# Patient Record
Sex: MALE | Race: WHITE
[De-identification: names, ages, dates, MRNs, and addresses within clinical notes are randomized per-mention and may not be internally consistent; named-entity substitution may affect disease eponyms.]

---

## 2020-04-08 ENCOUNTER — HOSPITAL ENCOUNTER (OUTPATIENT)
Dept: HOSPITAL 56 - MW.ED | Age: 35
LOS: 1 days | Discharge: HOME | End: 2020-04-09
Attending: SURGERY
Payer: COMMERCIAL

## 2020-04-08 DIAGNOSIS — Z79.899: ICD-10-CM

## 2020-04-08 DIAGNOSIS — I10: ICD-10-CM

## 2020-04-08 DIAGNOSIS — K35.80: Primary | ICD-10-CM

## 2020-04-08 LAB
BUN SERPL-MCNC: 20 MG/DL (ref 7–18)
CHLORIDE SERPL-SCNC: 100 MMOL/L (ref 98–107)
CO2 SERPL-SCNC: 24.7 MMOL/L (ref 21–32)
GLUCOSE SERPL-MCNC: 157 MG/DL (ref 74–106)
LIPASE SERPL-CCNC: 73 U/L (ref 73–393)
POTASSIUM SERPL-SCNC: 3.7 MMOL/L (ref 3.5–5.1)
SODIUM SERPL-SCNC: 137 MMOL/L (ref 136–148)

## 2020-04-08 PROCEDURE — C1776 JOINT DEVICE (IMPLANTABLE): HCPCS

## 2020-04-08 RX ADMIN — CEFOXITIN SODIUM SCH MLS/HR: 1 INJECTION, SOLUTION INTRAVENOUS at 20:11

## 2020-04-08 NOTE — PCM.SN
- Free Text/Narrative


Note: 





pt will finished iv mefoxin q6 X 23 hr, then home on pain meds and po abx

## 2020-04-08 NOTE — EDM.PDOC
ED [facility] GENERAL MEDICAL PROBLEM





- General


Chief Complaint: Abdominal Pain


Stated Complaint: ABDOMINAL PAIN


Time Seen by Provider: 04/08/20 12:36





- History of Present Illness


INITIAL COMMENTS - FREE TEXT/NARRATIVE: 





HPI


35-year-old male with no known pertinent past medical history presents for 

evaluation of ~18 hours of progressive right lower quadrant pain now 

accompanied by anorexia. Patient noted chills early in the symptom course, 

however this is now resolved, no vomiting, diarrhea, no further symptoms. No 

prior abdominal surgeries. Currently declined pain medication.





M/S/F/SocHx notable for: please see HPI; remainder reviewed with patient and in 

chart. 





ROS: Negative constitutional, eye, cardiovascular, pulmonary, GI, , MSK, skin

, neurologic, psychiatric, endocrine unless noted in the HPI.





Exam


, RR 18, /107, T 36.8C, SaO2 95% on room air.


Gen: Pleasant, non-toxic appearing, resting comfortably.


HEENT: NC, AT, PEERL, EOMI.


Resp: Clear to auscultation bilaterally, normal work of breathing, no accessory 

muscle usage.


Card: Regular rate and rhythm with no murmurs, rubs, or gallops, extremities 

warm and well perfused. 


GI: right lower quadrant tenderness palpation of McBurneys point, mild rebound 

tenderness, remainder of abdomen nontender to palpation, negative Moffett's sign

, non-distended, no guarding.


: No suprapubic tenderness to palpation.


MSK: No visible deformities, strength and tone without visually appreciable 

deficit.


Skin: Normal color with no visible lesions.


Neuro: alert and oriented  3, no facial asymmetry, vision and hearing WNL.


Psych: Mood and affect appropriate.





Labs / Imaging:


WBC 21.68, HB 15.5, sodium 137, potassium 3.7, AST 19, ALT 36, ALP 83, total 

bilirubin 1.1, lipase 73.





UA - pending. 





CT: Dilated appendix is seen containing several appendicoliths. Inflammatory 

change is seen around the appendix. Findings are compatible with appendicitis.





MDM


Previous chart, nursing note, labs, imaging, and vitals reviewed. 


A: 35-year-old male with no known pertinent past medical history presents for 

evaluation of ~18 hours of progressive right lower quadrant pain now 

accompanied by anorexia.





DDx: appendicitis, mesenteric adenitis, epiploic appendagitis, constipation, 

diverticulitis, biliary disease, ureterolithiasis.





Evaluation: patient with right lower quadrant tenderness, leukocytosis, and 

acute appendicitis (uncomplicated) on CT. No evidence of further abnormalities 

on above evaluation. 1 L NS and 2 g cefoxitin given in ED.





Disposition: patient admitted to Dr. Lennon for operative intervention.





Impression: acute appendicitis.


  ** Right Abdomen


Pain Score (Numeric/FACES): 6





- Related Data


 Allergies











Allergy/AdvReac Type Severity Reaction Status Date / Time


 


No Known Allergies Allergy   Verified 04/08/20 12:37











Home Meds: 


 Home Meds





Lisinopril [Zestril] 10 mg PO DAILY 04/08/20 [History]











Past Medical History


Cardiovascular History: Reports: Hypertension





- Infectious Disease History


Infectious Disease History: Reports: Chicken Pox





- Past Surgical History


Other Musculoskeletal Surgeries/Procedures:: Back Surgery





Social & Family History





- Tobacco Use


Smoking Status *Q: Never Smoker





- Caffeine Use


Caffeine Use: Reports: Coffee





- Recreational Drug Use


Recreational Drug Use: No





ED ROS GENERAL





- Review of Systems


Review Of Systems: See Below





ED EXAM, GENERAL





- Physical Exam


Exam: See Below





Course





- Vital Signs


Last Recorded V/S: 


 Last Vital Signs











Temp  37.5 C   04/08/20 13:55


 


Pulse  102 H  04/08/20 13:55


 


Resp  16   04/08/20 13:55


 


BP  158/90 H  04/08/20 13:55


 


Pulse Ox  96   04/08/20 13:55














- Orders/Labs/Meds


Orders: 


 Active Orders 24 hr











 Category Date Time Status


 


 Patient Status [ADT] Stat ADT  04/08/20 14:28 Ordered


 


 Sodium Chloride 0.9% [Normal Saline] 1,000 ml Med  04/08/20 14:24 Active





 IV .Bolus   


 


 cefOXitin [Mefoxin in Dextrose,Iso-Osm 2 GM/50 ML] 2 gm Med  04/08/20 14:29 

Ordered





 Premix Bag 1 bag   





 IV ONETIME   


 


 cefOXitin [Mefoxin] 2 gm Med  04/08/20 14:24 Active





 Sodium Chloride 0.9% [Normal Saline] 100 ml   





 IV ONETIME   








 Medication Orders





Sodium Chloride (Normal Saline)  1,000 mls @ 1,000 mls/hr IV .Bolus ONE


   Stop: 04/08/20 15:23


Cefoxitin Sodium 2 gm/ Sodium (Chloride)  100 mls @ 200 mls/hr IV ONETIME ONE


   Stop: 04/08/20 14:53








Labs: 


 Laboratory Tests











  04/08/20 04/08/20 04/08/20 Range/Units





  12:43 12:43 14:00 


 


WBC  21.68 H    (4.0-11.0)  K/uL


 


RBC  5.49    (4.50-5.90)  M/uL


 


Hgb  15.5    (13.0-17.0)  g/dL


 


Hct  45.5    (38.0-50.0)  %


 


MCV  82.9    (80.0-98.0)  fL


 


MCH  28.2    (27.0-32.0)  pg


 


MCHC  34.1    (31.0-37.0)  g/dL


 


RDW Std Deviation  37.9    (28.0-62.0)  fl


 


RDW Coeff of Janelle  13    (11.0-15.0)  %


 


Plt Count  267    (150-400)  K/uL


 


MPV  11.50    (7.40-12.00)  fL


 


Neut % (Auto)  84.3 H    (48.0-80.0)  %


 


Lymph % (Auto)  6.8 L    (16.0-40.0)  %


 


Mono % (Auto)  8.8    (0.0-15.0)  %


 


Eos % (Auto)  0.0    (0.0-7.0)  %


 


Baso % (Auto)  0.1    (0.0-1.5)  %


 


Neut # (Auto)  18.3 H    (1.4-5.7)  K/uL


 


Lymph # (Auto)  1.5    (0.6-2.4)  K/uL


 


Mono # (Auto)  1.9 H    (0.0-0.8)  K/uL


 


Eos # (Auto)  0.0    (0.0-0.7)  K/uL


 


Baso # (Auto)  0.0    (0.0-0.1)  K/uL


 


Nucleated RBC %  0.0    /100WBC


 


Nucleated RBCs #  0    K/uL


 


Sodium   137   (136-148)  mmol/L


 


Potassium   3.7   (3.5-5.1)  mmol/L


 


Chloride   100   ()  mmol/L


 


Carbon Dioxide   24.7   (21.0-32.0)  mmol/L


 


BUN   20 H   (7.0-18.0)  mg/dL


 


Creatinine   1.1   (0.8-1.3)  mg/dL


 


Est Cr Clr Drug Dosing   102.88   mL/min


 


Estimated GFR (MDRD)   > 60.0   ml/min


 


Glucose   157 H   ()  mg/dL


 


Calcium   8.9   (8.5-10.1)  mg/dL


 


Total Bilirubin   1.1 H   (0.2-1.0)  mg/dL


 


AST   19   (15-37)  IU/L


 


ALT   36   (14-63)  IU/L


 


Alkaline Phosphatase   83   ()  U/L


 


Total Protein   7.9   (6.4-8.2)  g/dL


 


Albumin   4.0   (3.4-5.0)  g/dL


 


Globulin   3.9   (2.6-4.0)  g/dL


 


Albumin/Globulin Ratio   1.0   (0.9-1.6)  


 


Lipase   73   ()  U/L


 


Urine Color    YELLOW  


 


Urine Appearance    CLEAR  


 


Urine pH    6.5  (5.0-8.0)  


 


Ur Specific Gravity    1.010  (1.001-1.035)  


 


Urine Protein    NEGATIVE  (NEGATIVE)  mg/dL


 


Urine Glucose (UA)    NEGATIVE  (NEGATIVE)  mg/dL


 


Urine Ketones    15 H  (NEGATIVE)  mg/dL


 


Urine Occult Blood    NEGATIVE  (NEGATIVE)  


 


Urine Nitrite    NEGATIVE  (NEGATIVE)  


 


Urine Bilirubin    NEGATIVE  (NEGATIVE)  


 


Urine Urobilinogen    1.0  (<2.0)  EU/dL


 


Ur Leukocyte Esterase    NEGATIVE  (NEGATIVE)  











Meds: 


Medications











Generic Name Dose Route Start Last Admin





  Trade Name Freq  PRN Reason Stop Dose Admin


 


Sodium Chloride  1,000 mls @ 1,000 mls/hr  04/08/20 14:24  





  Normal Saline  IV  04/08/20 15:23  





  .Bolus ONE   





     





     





     





     


 


Cefoxitin Sodium 2 gm/ Sodium  100 mls @ 200 mls/hr  04/08/20 14:24  





  Chloride  IV  04/08/20 14:53  





  ONETIME ONE   





     





     





     





     














Discontinued Medications














Generic Name Dose Route Start Last Admin





  Trade Name Freq  PRN Reason Stop Dose Admin


 


Iopamidol  100 ml  04/08/20 13:53  04/08/20 13:55





  Isovue Multipack-370 (76%)  IVPUSH  04/08/20 13:54  100 ml





  ONETIME STA   Administration





     





     





     





     














Departure





- Departure


Time of Disposition: 14:26


Disposition: Admitted As Inpatient 66


Clinical Impression: 


 Appendicitis








- Discharge Information


Referrals: 


PCP,Unobtain [Primary Care Provider] - 


Forms:  ED Department Discharge





Sepsis Event Note





- Evaluation


Sepsis Screening Result: No Definite Risk





- Focused Exam


Vital Signs: 


 Vital Signs











  Temp Pulse Resp BP Pulse Ox


 


 04/08/20 13:55  37.5 C  102 H  16  158/90 H  96


 


 04/08/20 12:34  36.8 C  130 H  18  159/107 H  95











Date Exam was Performed: 04/08/20


Time Exam was Performed: 14:30





- My Orders


Last 24 Hours: 


My Active Orders





04/08/20 14:24


Sodium Chloride 0.9% [Normal Saline] 1,000 ml IV .Bolus 


cefOXitin [Mefoxin] 2 gm   Sodium Chloride 0.9% [Normal Saline] 100 ml IV 

ONETIME 





04/08/20 14:28


Patient Status [ADT] Stat 





04/08/20 14:29


cefOXitin [Mefoxin in Dextrose,Iso-Osm 2 GM/50 ML] 2 gm   Premix Bag 1 bag IV 

ONETIME 














- Assessment/Plan


Last 24 Hours: 


My Active Orders





04/08/20 14:24


Sodium Chloride 0.9% [Normal Saline] 1,000 ml IV .Bolus 


cefOXitin [Mefoxin] 2 gm   Sodium Chloride 0.9% [Normal Saline] 100 ml IV 

ONETIME 





04/08/20 14:28


Patient Status [ADT] Stat 





04/08/20 14:29


cefOXitin [Mefoxin in Dextrose,Iso-Osm 2 GM/50 ML] 2 gm   Premix Bag 1 bag IV 

ONETIME

## 2020-04-08 NOTE — OR
SURGEON:

Doug Lennon MD

 

DATE OF PROCEDURE:  04/08/2020

 

PREOPERATIVE DIAGNOSIS:

Acute appendicitis.

 

POSTOPERATIVE DIAGNOSIS:

Acute appendicitis.

 

PROCEDURE PERFORMED:

Laparoscopic appendectomy.

 

PRIMARY SURGEON:

Doug Lennon MD

 

COMPLICATIONS:

None.

 

FINDINGS:

The appendix is very big, the whole length of the appendix is almost like 12 cm

and is severely inflamed and hardened like a rock from the distal tip to the

proximal 2/3 of the appendix and the proximal 1/3 of the appendix looks less

affected.  Although gross perforation is not observed, the patient would benefit

from 24 hours of IV antibiotic because the appendix is appendicitis suppurativa.

 

DESCRIPTION OF PROCEDURE:

The patient was taken to the operating room and placed in the supine position.

Following induction of general endotracheal anesthesia, the patient's abdomen

was prepped and draped in the sterile fashion.  A time-out has been called.  The

patient was identified.  The procedure was identified.  The antibiotics were

identified.  The procedure then proceeded.

 

The abdomen was prepped and draped in a standard fashion.  After assessment of

appropriate landmarks, a 12 millimeter trocar was inserted supraumbilically

using Optiview and pneumoperitoneum was then achieved.  This was followed with

placement of 5 millimeter port in the right upper quadrant and another 5

millimeter port infraumbilically.  The camera was inserted supraumbilical site

and two laparoscopic Carolee retractors were then inserted through the other two

sites.  Following the cecum, the appendix was located.  The appendix was then

lifted up, and using a GI stapler the appendix was amputated at the base.  And

using the GI stapler, the mesoappendix was then amputated.  The appendix was

retrieved by an endoscopic bag and sent for pathologist.

 

This was then followed by re-insertion of the camera to examine the staple line,

and hemostasis.  The trocars were then removed.

 

The umbilical site was closed with 2-0 Vicryl deep stitch and 4 -0 Vicryl and

Dermabond; the other 2 5 mm port sites were closed with 4-0 Vicryl and

Dermabond.

 

The patient was then awakened, extubated, and transferred to the recovery room

in hemodynamically stable condition.  Prior to closing, sponge count and

instrument count was correct.

 

Intraoperative findings as dictated above.  At the end of the surgery, Surgicel

was placed for hemostasis and the skin approximated by use of skin staple and

followed by appropriate dressing.  Prior to all this, sponge count and

instrument count were correct.

 

Dr. Lennon was present throughout the whole procedure.

 

As always, thank you for the kind referral.

 

 

TIMI ANDRADE

DD:  04/08/2020 17:07:11

DT:  04/08/2020 17:47:07

Job #:  [id number]

## 2020-04-08 NOTE — CONS
DATE OF CONSULTATION:

04/08/2020

 

YOB: 1985

 

PRIMARY CARE PHYSICIAN:

Dyllanobpenelope PCP

 

This consult from Dr. Samir Holder in the emergency room.

 

CONSULTING QUESTION:

Acute appendicitis.

 

HISTORY OF PRESENT ILLNESS:

The patient is a 35 years old  gentleman, normal built, not obese,

complaining of 36 hour history of a progressive onset of periumbilical pain,

subsequently migrated to the right lower quadrant.  Denied prior episode.

Denied fever, chill, or diarrhea, and pain was rated as almost 8 and pain

progressive getting into the emergency room and getting to the pain 10 on the

pain scale.

 

PAST MEDICAL HISTORY:

Significant for no diabetic, MI, CVA, and hypertension.

 

PAST SURGICAL HISTORY:

Had some kind of herniated disk at the back, back surgery.

 

ALLERGY:

Please refer to nursing for details.

 

MEDICATIONS:

Please refer to nursing for details.

 

SOCIAL HISTORY:

No tobacco or alcohol.

 

FAMILY HISTORY:

Noncontributory.  There is no malignant hyperthermia in family history.

 

PHYSICAL EXAMINATION:

GENERAL:  On examination, a very pleasant gentleman.  Even in a lot of pain,

smiled to doctor.  Very polite and very pleasant.

HEENT:  Normocephalic and atraumatic.  Sclerae anicteric.

LUNGS:  Clear to auscultation.

HEART:  Regular rate and rhythm.

ABDOMEN:  Soft, nondistended.  No pulsating tender midline abdominal structure.

No surgical scar.  There is a small umbilical hernia, nontender.  Exquisite

tenderness on the McBurney point, almost jumped off the table.

 

LABORATORY DATA:

Upon consultation, white count is 21.68 and H and H are 15.5 and 45.5, platelet

is 267.  Sodium is 137, potassium 3.7, BUN is 20, creatinine is 1.1, the patient

is very dry.  Total bilirubin is small elevated at 1.1, and glucose is 157.

Lipase is normal at 73.  Urine, no signs or symptoms of UTI.  CAT scan reading,

dilated appendix compatible with appendicitis with inflammatory changes and

dilated appendix compatible with of appendicitis.

 

IMPRESSION:

Elevated white count at 22, clinical exam and CAT scan compatible with acute

appendicitis.  The patient was offered surgical intervention, laparoscopic

versus open and the risks and benefits discussed with the patient.  The patient

concurred to proceed as planned.  Benefit is to remove the appendix and take

care of the situation and the risks involved with bleeding, infection, and

damage to the nearby organs.  The patient concurred to proceed as planned.  The

patient will get IV fluid, antibiotic and 2 g IV Mefoxin, surgical consent,

ready for surgery.

 

 

TIMI / LUPE

DD:  04/08/2020 15:19:19

DT:  04/08/2020 16:10:34

Job #:  [id number]

## 2020-04-08 NOTE — PCM.PREANE
Preanesthetic Assessment





- Anesthesia/Transfusion/Family Hx


Family History of Anesthesia Reaction: No


Transfusion History: Prior Transfusion Reaction





- Review of Systems


Gastrointestinal: Abdominal Pain





- Physical Assessment


NPO Status Date: 04/08/20


NPO Status Time: 10:00


Vital Signs: 





 Last Vital Signs











Temp  37.5 C   04/08/20 13:55


 


Pulse  102 H  04/08/20 13:55


 


Resp  16   04/08/20 13:55


 


BP  158/90 H  04/08/20 13:55


 


Pulse Ox  96   04/08/20 13:55











Height: 1.83 m


Weight: 99.79 kg


ASA Class: 1E





- Lab


Values: 





 Laboratory Last Values











WBC  21.68 K/uL (4.0-11.0)  H  04/08/20  12:43    


 


RBC  5.49 M/uL (4.50-5.90)   04/08/20  12:43    


 


Hgb  15.5 g/dL (13.0-17.0)   04/08/20  12:43    


 


Hct  45.5 % (38.0-50.0)   04/08/20  12:43    


 


MCV  82.9 fL (80.0-98.0)   04/08/20  12:43    


 


MCH  28.2 pg (27.0-32.0)   04/08/20  12:43    


 


MCHC  34.1 g/dL (31.0-37.0)   04/08/20  12:43    


 


RDW Std Deviation  37.9 fl (28.0-62.0)   04/08/20  12:43    


 


RDW Coeff of Janelle  13 % (11.0-15.0)   04/08/20  12:43    


 


Plt Count  267 K/uL (150-400)   04/08/20  12:43    


 


MPV  11.50 fL (7.40-12.00)   04/08/20  12:43    


 


Neut % (Auto)  84.3 % (48.0-80.0)  H  04/08/20  12:43    


 


Lymph % (Auto)  6.8 % (16.0-40.0)  L  04/08/20  12:43    


 


Mono % (Auto)  8.8 % (0.0-15.0)   04/08/20  12:43    


 


Eos % (Auto)  0.0 % (0.0-7.0)   04/08/20  12:43    


 


Baso % (Auto)  0.1 % (0.0-1.5)   04/08/20  12:43    


 


Neut # (Auto)  18.3 K/uL (1.4-5.7)  H  04/08/20  12:43    


 


Lymph # (Auto)  1.5 K/uL (0.6-2.4)   04/08/20  12:43    


 


Mono # (Auto)  1.9 K/uL (0.0-0.8)  H  04/08/20  12:43    


 


Eos # (Auto)  0.0 K/uL (0.0-0.7)   04/08/20  12:43    


 


Baso # (Auto)  0.0 K/uL (0.0-0.1)   04/08/20  12:43    


 


Nucleated RBC %  0.0 /100WBC  04/08/20  12:43    


 


Nucleated RBCs #  0 K/uL  04/08/20  12:43    


 


Sodium  137 mmol/L (136-148)   04/08/20  12:43    


 


Potassium  3.7 mmol/L (3.5-5.1)   04/08/20  12:43    


 


Chloride  100 mmol/L ()   04/08/20  12:43    


 


Carbon Dioxide  24.7 mmol/L (21.0-32.0)   04/08/20  12:43    


 


BUN  20 mg/dL (7.0-18.0)  H  04/08/20  12:43    


 


Creatinine  1.1 mg/dL (0.8-1.3)   04/08/20  12:43    


 


Est Cr Clr Drug Dosing  102.88 mL/min  04/08/20  12:43    


 


Estimated GFR (MDRD)  > 60.0 ml/min  04/08/20  12:43    


 


Glucose  157 mg/dL ()  H  04/08/20  12:43    


 


Calcium  8.9 mg/dL (8.5-10.1)   04/08/20  12:43    


 


Total Bilirubin  1.1 mg/dL (0.2-1.0)  H  04/08/20  12:43    


 


AST  19 IU/L (15-37)   04/08/20  12:43    


 


ALT  36 IU/L (14-63)   04/08/20  12:43    


 


Alkaline Phosphatase  83 U/L ()   04/08/20  12:43    


 


Total Protein  7.9 g/dL (6.4-8.2)   04/08/20  12:43    


 


Albumin  4.0 g/dL (3.4-5.0)   04/08/20  12:43    


 


Globulin  3.9 g/dL (2.6-4.0)   04/08/20  12:43    


 


Albumin/Globulin Ratio  1.0  (0.9-1.6)   04/08/20  12:43    


 


Lipase  73 U/L ()   04/08/20  12:43    


 


Urine Color  YELLOW   04/08/20  14:00    


 


Urine Appearance  CLEAR   04/08/20  14:00    


 


Urine pH  6.5  (5.0-8.0)   04/08/20  14:00    


 


Ur Specific Gravity  1.010  (1.001-1.035)   04/08/20  14:00    


 


Urine Protein  NEGATIVE mg/dL (NEGATIVE)   04/08/20  14:00    


 


Urine Glucose (UA)  NEGATIVE mg/dL (NEGATIVE)   04/08/20  14:00    


 


Urine Ketones  15 mg/dL (NEGATIVE)  H  04/08/20  14:00    


 


Urine Occult Blood  NEGATIVE  (NEGATIVE)   04/08/20  14:00    


 


Urine Nitrite  NEGATIVE  (NEGATIVE)   04/08/20  14:00    


 


Urine Bilirubin  NEGATIVE  (NEGATIVE)   04/08/20  14:00    


 


Urine Urobilinogen  1.0 EU/dL (<2.0)   04/08/20  14:00    


 


Ur Leukocyte Esterase  NEGATIVE  (NEGATIVE)   04/08/20  14:00    














- Allergies


Allergies/Adverse Reactions: 


 Allergies











Allergy/AdvReac Type Severity Reaction Status Date / Time


 


No Known Allergies Allergy   Verified 04/08/20 12:37














- Acknowledgements


Anesthesia Type Planned: General Anesthesia


Pt an Appropriate Candidate for the Planned Anesthesia: Yes


Alternatives and Risks of Anesthesia Discussed w Pt/Guardian: Yes


Pt/Guardian Understands and Agrees with Anesthesia Plan: Yes





PreAnesthesia Questionnaire


Cardiovascular History: Reports: Hypertension





- Infectious Disease History


Infectious Disease History: Reports: Chicken Pox





- Past Surgical History


Other Musculoskeletal Surgeries/Procedures:: Back Surgery





- SUBSTANCE USE


Smoking Status *Q: Never Smoker


Recreational Drug Use History: No





- HOME MEDS


Home Medications: 


 Home Meds





Lisinopril [Zestril] 10 mg PO DAILY 04/08/20 [History]











- CURRENT (IN HOUSE) MEDS


Current Meds: 





 Current Medications








Discontinued Medications





Albuterol (Proventil Hfa) Confirm Administered Dose 6.7 gm INH .STK-MED ONE


   Stop: 04/08/20 15:26


Bupivacaine HCl/Epinephrine Bitart (Marcaine 0.25%/Epinephrine 1:200,000) 

Confirm Administered Dose 10 ml .ROUTE .STK-MED ONE


   Stop: 04/08/20 15:03


Fentanyl (Sublimaze) Confirm Administered Dose 250 mcg .ROUTE .STK-MED ONE


   Stop: 04/08/20 15:26


Glycopyrrolate (Robinul) Confirm Administered Dose 0.2 mg .ROUTE .STK-MED ONE


   Stop: 04/08/20 15:24


Sodium Chloride (Normal Saline)  1,000 mls @ 1,000 mls/hr IV .Bolus ONE


   Stop: 04/08/20 15:23


   Last Infusion: 04/08/20 15:30 Dose:  150 mls/hr


Cefoxitin Sodium 2 gm/ Premix  50 mls @ 100 mls/hr IV ONETIME ONE


   Stop: 04/08/20 14:58


   Last Admin: 04/08/20 14:36 Dose:  100 mls/hr


Iopamidol (Isovue Multipack-370 (76%))  100 ml IVPUSH ONETIME STA


   Stop: 04/08/20 13:54


   Last Admin: 04/08/20 13:55 Dose:  100 ml


Ketorolac Tromethamine (Toradol) Confirm Administered Dose 30 mg .ROUTE .STK-

MED ONE


   Stop: 04/08/20 15:24


Lidocaine (Xylocaine-Mpf 2%) Confirm Administered Dose 5 ml .ROUTE .STK-MED ONE


   Stop: 04/08/20 15:24


Midazolam HCl (Versed 1 Mg/Ml) Confirm Administered Dose 2 mg .ROUTE .STK-MED 

ONE


   Stop: 04/08/20 15:26


Ondansetron HCl (Zofran) Confirm Administered Dose 4 mg .ROUTE .STK-MED ONE


   Stop: 04/08/20 15:24


Propofol (Diprivan  20 Ml) Confirm Administered Dose 200 mg .ROUTE .STK-MED ONE


   Stop: 04/08/20 15:26


Rocuronium Bromide (Zemuron) Confirm Administered Dose 100 mg .ROUTE .STK-MED 

ONE


   Stop: 04/08/20 15:24


Sugammadex Sodium (Bridion) Confirm Administered Dose 200 mg .ROUTE .STK-MED ONE


   Stop: 04/08/20 16:02

## 2020-04-08 NOTE — PCM.OPNOTE
- General Post-Op/Procedure Note


Findings: 


appendix was large, severely scarred down with abd wall; gross perf is not 

observed, but appendix looked appendicitis suppurativa; would benefit from 24 

hrs iv abx; [id number]


Pre Op Diagnosis: appendicitis


Post-Op Diagnosis: Same


Anesthesia Technique: General ET Tube


Primary Surgeon: Doug Lennon


Pathology: 





sent


Complications: None


Condition: Good

## 2020-04-08 NOTE — PCM.POSTAN
POST ANESTHESIA ASSESSMENT





- MENTAL STATUS


Mental Status: Alert





- VITAL SIGNS


Vital Signs: 


 Last Vital Signs











Temp  37 C   04/08/20 17:02


 


Pulse  108 H  04/08/20 17:33


 


Resp  13   04/08/20 17:33


 


BP  133/84   04/08/20 17:33


 


Pulse Ox  99   04/08/20 17:33














- RESPIRATORY


Respiratory Status: Respiratory Rate WNL





- CARDIOVASCULAR


CV Status: Pulse Rate WNL





- GASTROINTESTINAL


GI Status: No Symptoms





- POST OP HYDRATION


Hydration Status: Adequate & Stable

## 2020-04-08 NOTE — PCM.SN
- Free Text/Narrative


Note: 


pt seen, chart reviewed; h/p dictated; acute appendicitis > to surgery, lap vs 

open, rb dw pt re bleeding/infection/drain placement/damage to nearby organs/po 

course; pt concur and proceed; ivf/shaving/mefoxin 2g iv > to surgery; [id number]

## 2020-04-09 RX ADMIN — CEFOXITIN SODIUM SCH: 1 INJECTION, SOLUTION INTRAVENOUS at 14:31

## 2020-04-09 RX ADMIN — CEFOXITIN SODIUM SCH MLS/HR: 1 INJECTION, SOLUTION INTRAVENOUS at 01:34

## 2020-04-09 RX ADMIN — OXYCODONE HYDROCHLORIDE AND ACETAMINOPHEN PRN TAB: 5; 325 TABLET ORAL at 14:27

## 2020-04-09 RX ADMIN — OXYCODONE HYDROCHLORIDE AND ACETAMINOPHEN PRN TAB: 5; 325 TABLET ORAL at 07:29

## 2020-04-09 RX ADMIN — CEFOXITIN SODIUM SCH MLS/HR: 1 INJECTION, SOLUTION INTRAVENOUS at 07:16

## 2020-04-09 RX ADMIN — OXYCODONE HYDROCHLORIDE AND ACETAMINOPHEN PRN TAB: 5; 325 TABLET ORAL at 00:00

## 2020-04-09 NOTE — [id number]
Post Anesthesia Note





- EVALUATION WITHIN 48HRS OF ANESTHETIC


Vital Signs in Normal Range: Yes


Patient Participated in Evaluation: Yes


Respiratory Function Stable: Yes


Airway Patent: Yes


Cardiovascular Function Stable: Yes


Hydration Status Stable: Yes


Pain Control Satisfactory: Yes


Nausea and Vomiting Control Satisfactory: Yes


Mental Status Recovered: Yes


Vital Signs: 


 Last Vital Signs











Temp  36.8 C   04/09/20 07:00


 


Pulse  88   04/09/20 07:00


 


Resp  17   04/09/20 07:00


 


BP  125/72   04/09/20 07:00


 


Pulse Ox  95   04/09/20 07:00